# Patient Record
Sex: FEMALE | Race: OTHER | Employment: OTHER | URBAN - METROPOLITAN AREA
[De-identification: names, ages, dates, MRNs, and addresses within clinical notes are randomized per-mention and may not be internally consistent; named-entity substitution may affect disease eponyms.]

---

## 2020-11-11 NOTE — PATIENT DISCUSSION
HX OF RETINAL DETACHMENT WITH SCLERAL BUCKLE REPAIR, OS - UNABLE TO VISUALIZE RETINA TODAY DUE TO DECENTERED/FIBROTIC PC IOL.

## 2020-11-11 NOTE — PATIENT DISCUSSION
PC IOL DECENTERED AND FIBROTIC, OS - PATIENT ED ON LIMITED VISUAL POTENTIAL DUE TO OLD RETINAL DETACHMENT/REPAIR. UNABLE TO VISUALIZE RETINA TODAY. PATIENT NEEDS CONSULT WITH DR Stephen Gil TO DETERMINE IF IOL REPAIR WARRENTED.

## 2020-11-11 NOTE — PATIENT DISCUSSION
POSTERIOR CAPSULAR FIBROSIS/OPACIFICATION, OD - VISUALLY SIGNIFICANT. PATIENT NEEDS YAG EVAL WITH DR Dylon Del Rio TO IMPROVE VISION.

## 2020-12-30 NOTE — PATIENT DISCUSSION
High level of fixation losses in both eye, 4/20 OD and 10/20 OS. Nonspecific peripheral changes OD and small superior defect OS.  Could be due to droopy eyelids or past placement of scleral buckle after retinal detachment

## 2022-10-27 ENCOUNTER — NEW PATIENT (OUTPATIENT)
Dept: URBAN - METROPOLITAN AREA CLINIC 35 | Facility: CLINIC | Age: 62
End: 2022-10-27

## 2022-10-27 DIAGNOSIS — Z01.00: ICD-10-CM

## 2022-10-27 DIAGNOSIS — H52.7: ICD-10-CM

## 2022-10-27 PROCEDURE — 92004 COMPRE OPH EXAM NEW PT 1/>: CPT

## 2022-10-27 PROCEDURE — 92015 DETERMINE REFRACTIVE STATE: CPT

## 2022-10-27 ASSESSMENT — VISUAL ACUITY
OS_CC: 20/20-2
OD_CC: 20/20
OD_CC: J1
OS_SC: 20/20-1
OS_CC: J1
OS_SC: J12
OD_SC: 20/25-2
OD_SC: J12

## 2022-10-27 ASSESSMENT — TONOMETRY
OD_IOP_MMHG: 13
OS_IOP_MMHG: 13

## 2022-11-11 ENCOUNTER — CONTACT LENSES/GLASSES VISIT (OUTPATIENT)
Dept: URBAN - METROPOLITAN AREA CLINIC 35 | Facility: CLINIC | Age: 62
End: 2022-11-11

## 2022-11-11 DIAGNOSIS — H52.7: ICD-10-CM

## 2022-11-11 DIAGNOSIS — Z01.00: ICD-10-CM

## 2022-11-11 PROCEDURE — 92310 CONTACT LENS FITTING OU: CPT

## 2022-11-11 PROCEDURE — 92310-3 LEVEL 3 CONTACT LENS MANAGEMENT

## 2023-10-30 ENCOUNTER — COMPREHENSIVE EXAM (OUTPATIENT)
Dept: URBAN - METROPOLITAN AREA CLINIC 35 | Facility: CLINIC | Age: 63
End: 2023-10-30

## 2023-10-30 DIAGNOSIS — H52.7: ICD-10-CM

## 2023-10-30 DIAGNOSIS — Z46.0: ICD-10-CM

## 2023-10-30 DIAGNOSIS — Z01.00: ICD-10-CM

## 2023-10-30 PROCEDURE — 92015 DETERMINE REFRACTIVE STATE: CPT

## 2023-10-30 PROCEDURE — 92014 COMPRE OPH EXAM EST PT 1/>: CPT

## 2023-10-30 PROCEDURE — 92310-3 LEVEL 3 CONTACT LENS MANAGEMENT

## 2023-10-30 ASSESSMENT — TONOMETRY
OD_IOP_MMHG: 16
OS_IOP_MMHG: 14

## 2023-10-30 ASSESSMENT — VISUAL ACUITY
OD_CC: 20/25+2
OS_CC: 20/25+2
OU_CC: J1
OS_CC: J1
OD_CC: J1

## 2023-10-30 ASSESSMENT — KERATOMETRY
OS_AXISANGLE2_DEGREES: 125
OS_K2POWER_DIOPTERS: 43.25
OS_AXISANGLE_DEGREES: 35
OD_K1POWER_DIOPTERS: 42.75
OD_AXISANGLE2_DEGREES: 50
OD_K2POWER_DIOPTERS: 43.75
OD_AXISANGLE_DEGREES: 140
OS_K1POWER_DIOPTERS: 42.5

## 2023-11-13 ENCOUNTER — CONTACT LENSES/GLASSES VISIT (OUTPATIENT)
Dept: URBAN - METROPOLITAN AREA CLINIC 35 | Facility: CLINIC | Age: 63
End: 2023-11-13

## 2023-11-13 DIAGNOSIS — Z46.0: ICD-10-CM

## 2023-11-13 PROCEDURE — 92310F

## 2023-11-13 ASSESSMENT — KERATOMETRY
OD_K2POWER_DIOPTERS: 43.75
OS_K1POWER_DIOPTERS: 42.5
OS_AXISANGLE_DEGREES: 35
OD_AXISANGLE2_DEGREES: 50
OS_AXISANGLE2_DEGREES: 125
OD_AXISANGLE_DEGREES: 140
OS_K2POWER_DIOPTERS: 43.25
OD_K1POWER_DIOPTERS: 42.75

## 2023-11-13 ASSESSMENT — VISUAL ACUITY
OU_CC: 20/20
OU_CC: J1
OS_CC: J1
OD_CC: 20/20